# Patient Record
Sex: MALE
[De-identification: names, ages, dates, MRNs, and addresses within clinical notes are randomized per-mention and may not be internally consistent; named-entity substitution may affect disease eponyms.]

---

## 2023-02-02 ENCOUNTER — NURSE TRIAGE (OUTPATIENT)
Dept: OTHER | Facility: CLINIC | Age: 68
End: 2023-02-02

## 2023-02-02 NOTE — TELEPHONE ENCOUNTER
Location of patient: [place] [facility] call from Vianey Cooper with [facility]. Karen Cedeño MRN: [id number]    Subjective: Caller states \"ribs hurt when he coughs, slight chest pain, chest congestion, cannot lay flat\" Has been seen and is on doxycycline    Current Symptoms: see above, some sob, fatigue    At this point patient got frustrated, states  he had been on the phone for a long time and talked to 4 different people. He states he will call back and tomorrow and speak to someone in the office, no further triage performed.         Reason for Disposition   Caller is angry or rude (e.g., hangs up, verbally abusive, yelling)   Caller hangs up    Protocols used: No Contact or Duplicate Contact Call-ADULT-AH, Difficult Call-ADULT-AH